# Patient Record
Sex: FEMALE | Race: WHITE | NOT HISPANIC OR LATINO | Employment: OTHER | URBAN - METROPOLITAN AREA
[De-identification: names, ages, dates, MRNs, and addresses within clinical notes are randomized per-mention and may not be internally consistent; named-entity substitution may affect disease eponyms.]

---

## 2018-02-20 ENCOUNTER — ESTABLISHED COMPREHENSIVE EXAM (OUTPATIENT)
Dept: URBAN - METROPOLITAN AREA CLINIC 39 | Facility: CLINIC | Age: 33
End: 2018-02-20

## 2018-02-20 DIAGNOSIS — H04.123: ICD-10-CM

## 2018-02-20 DIAGNOSIS — Z98.890: ICD-10-CM

## 2018-02-20 PROCEDURE — 92014CFS ESTABLISHED PT, EMPLOYEE ROUTINE COMP EXAM

## 2018-02-20 ASSESSMENT — TONOMETRY
OD_IOP_MMHG: 14
OS_IOP_MMHG: 14

## 2018-02-20 ASSESSMENT — KERATOMETRY
OD_AXISANGLE_DEGREES: 178
OD_K2POWER_DIOPTERS: 45.00
OS_AXISANGLE2_DEGREES: 093
OD_AXISANGLE2_DEGREES: 088
OD_K1POWER_DIOPTERS: 44.25
OS_K1POWER_DIOPTERS: 44.50
OS_AXISANGLE_DEGREES: 003
OS_K2POWER_DIOPTERS: 45.50

## 2018-02-20 ASSESSMENT — VISUAL ACUITY
OD_SC: J1
OS_SC: 20/25+2
OS_SC: J1
OD_SC: 20/20-2

## 2021-06-24 NOTE — PROCEDURE NOTE: SURGICAL
<p>Prior to commencing surgery patient identification, surgical procedure, site, and side were confirmed by Dr. Aislinn Rios. Following topical proparacaine anesthesia, the patient was positioned at the YAG laser, a contact lens coupled to the cornea with methylcellulose and an axial posterior capsulotomy performed without complication using 3.4 Mj x 24. Excess methylcellulose was washed from the eye, one drop of Alphagan was instilled and the patient returned to the holding area having tolerated the procedure well and without complication. </p><p> [id number]</p>

## 2021-09-23 NOTE — PROCEDURE NOTE: SURGICAL
<p>Prior to commencing surgery patient identification, surgical procedure, site, and side were confirmed by Dr. Eloy Lopez. Following topical proparacaine anesthesia, the patient was positioned at the YAG laser, a contact lens coupled to the cornea with methylcellulose and an axial posterior capsulotomy performed without complication using 3.3 Mj x 34. Excess methylcellulose was washed from the eye, one drop of Alphagan was instilled and the patient returned to the holding area having tolerated the procedure well and without complication. </p><p>MRN [id number]</p>

## 2022-02-03 ENCOUNTER — COMPREHENSIVE EXAM (OUTPATIENT)
Dept: URBAN - METROPOLITAN AREA CLINIC 39 | Facility: CLINIC | Age: 37
End: 2022-02-03

## 2022-02-03 DIAGNOSIS — Z98.890: ICD-10-CM

## 2022-02-03 DIAGNOSIS — H04.123: ICD-10-CM

## 2022-02-03 PROCEDURE — 92014CFS ESTABLISHED PT, EMPLOYEE ROUTINE COMP EXAM

## 2022-02-03 ASSESSMENT — KERATOMETRY
OS_AXISANGLE_DEGREES: 003
OD_K1POWER_DIOPTERS: 44.25
OD_K2POWER_DIOPTERS: 45.00
OS_AXISANGLE2_DEGREES: 093
OS_K1POWER_DIOPTERS: 44.50
OS_K2POWER_DIOPTERS: 45.50
OD_AXISANGLE2_DEGREES: 088
OD_AXISANGLE_DEGREES: 178

## 2022-02-03 ASSESSMENT — VISUAL ACUITY
OU_SC: J1+
OD_SC: J1+
OU_SC: 20/15
OD_SC: 20/20
OS_SC: 20/20
OS_SC: J1+

## 2022-02-03 ASSESSMENT — TONOMETRY
OD_IOP_MMHG: 13
OS_IOP_MMHG: 14

## 2023-10-27 ENCOUNTER — ESTABLISHED PATIENT (OUTPATIENT)
Dept: URBAN - METROPOLITAN AREA CLINIC 39 | Facility: CLINIC | Age: 38
End: 2023-10-27

## 2023-10-27 PROCEDURE — 96999SEM SEMAGLUTIDE

## 2023-10-27 ASSESSMENT — KERATOMETRY
OD_K2POWER_DIOPTERS: 45.00
OS_AXISANGLE2_DEGREES: 093
OD_AXISANGLE_DEGREES: 178
OS_AXISANGLE_DEGREES: 003
OD_K1POWER_DIOPTERS: 44.25
OS_K2POWER_DIOPTERS: 45.50
OD_AXISANGLE2_DEGREES: 088
OS_K1POWER_DIOPTERS: 44.50

## 2023-11-03 ENCOUNTER — FOLLOW UP (OUTPATIENT)
Dept: URBAN - METROPOLITAN AREA CLINIC 35 | Facility: CLINIC | Age: 38
End: 2023-11-03

## 2023-11-03 PROCEDURE — 96999SEM SEMAGLUTIDE

## 2023-11-03 ASSESSMENT — KERATOMETRY
OS_K2POWER_DIOPTERS: 45.50
OD_K1POWER_DIOPTERS: 44.25
OD_K2POWER_DIOPTERS: 45.00
OD_AXISANGLE_DEGREES: 178
OS_AXISANGLE_DEGREES: 003
OS_K1POWER_DIOPTERS: 44.50
OD_AXISANGLE2_DEGREES: 088
OS_AXISANGLE2_DEGREES: 093

## 2023-11-10 ENCOUNTER — FOLLOW UP (OUTPATIENT)
Dept: URBAN - METROPOLITAN AREA CLINIC 39 | Facility: CLINIC | Age: 38
End: 2023-11-10

## 2023-11-10 PROCEDURE — 96999SEM SEMAGLUTIDE

## 2023-11-10 ASSESSMENT — KERATOMETRY
OD_K1POWER_DIOPTERS: 44.25
OD_AXISANGLE2_DEGREES: 088
OS_AXISANGLE_DEGREES: 003
OS_K2POWER_DIOPTERS: 45.50
OS_AXISANGLE2_DEGREES: 093
OD_K2POWER_DIOPTERS: 45.00
OD_AXISANGLE_DEGREES: 178
OS_K1POWER_DIOPTERS: 44.50

## 2023-11-17 ENCOUNTER — FOLLOW UP (OUTPATIENT)
Dept: URBAN - METROPOLITAN AREA CLINIC 35 | Facility: CLINIC | Age: 38
End: 2023-11-17

## 2023-11-17 PROCEDURE — 96999SEM SEMAGLUTIDE

## 2023-11-17 ASSESSMENT — KERATOMETRY
OS_K2POWER_DIOPTERS: 45.50
OS_AXISANGLE_DEGREES: 003
OD_AXISANGLE2_DEGREES: 088
OD_K2POWER_DIOPTERS: 45.00
OD_AXISANGLE_DEGREES: 178
OS_AXISANGLE2_DEGREES: 093
OS_K1POWER_DIOPTERS: 44.50
OD_K1POWER_DIOPTERS: 44.25

## 2023-12-01 ENCOUNTER — FOLLOW UP (OUTPATIENT)
Dept: URBAN - METROPOLITAN AREA CLINIC 35 | Facility: CLINIC | Age: 38
End: 2023-12-01

## 2023-12-01 PROCEDURE — 96999SEM SEMAGLUTIDE

## 2023-12-01 ASSESSMENT — KERATOMETRY
OD_K2POWER_DIOPTERS: 45.00
OS_K1POWER_DIOPTERS: 44.50
OS_AXISANGLE_DEGREES: 003
OD_K1POWER_DIOPTERS: 44.25
OD_AXISANGLE2_DEGREES: 088
OD_AXISANGLE_DEGREES: 178
OS_K2POWER_DIOPTERS: 45.50
OS_AXISANGLE2_DEGREES: 093

## 2023-12-08 ENCOUNTER — FOLLOW UP (OUTPATIENT)
Dept: URBAN - METROPOLITAN AREA CLINIC 39 | Facility: CLINIC | Age: 38
End: 2023-12-08

## 2023-12-08 PROCEDURE — 96999SEM SEMAGLUTIDE

## 2023-12-08 ASSESSMENT — KERATOMETRY
OD_AXISANGLE2_DEGREES: 088
OS_AXISANGLE2_DEGREES: 093
OS_AXISANGLE_DEGREES: 003
OS_K2POWER_DIOPTERS: 45.50
OD_K2POWER_DIOPTERS: 45.00
OD_AXISANGLE_DEGREES: 178
OD_K1POWER_DIOPTERS: 44.25
OS_K1POWER_DIOPTERS: 44.50

## 2023-12-15 ENCOUNTER — FOLLOW UP (OUTPATIENT)
Dept: URBAN - METROPOLITAN AREA CLINIC 35 | Facility: CLINIC | Age: 38
End: 2023-12-15

## 2023-12-15 PROCEDURE — 96999SEM SEMAGLUTIDE

## 2023-12-15 ASSESSMENT — KERATOMETRY
OS_AXISANGLE_DEGREES: 003
OD_AXISANGLE2_DEGREES: 088
OS_AXISANGLE2_DEGREES: 093
OD_K1POWER_DIOPTERS: 44.25
OD_K2POWER_DIOPTERS: 45.00
OS_K1POWER_DIOPTERS: 44.50
OD_AXISANGLE_DEGREES: 178
OS_K2POWER_DIOPTERS: 45.50

## 2023-12-22 ENCOUNTER — FOLLOW UP (OUTPATIENT)
Dept: URBAN - METROPOLITAN AREA CLINIC 39 | Facility: CLINIC | Age: 38
End: 2023-12-22

## 2023-12-22 PROCEDURE — 96999SEM SEMAGLUTIDE

## 2023-12-22 ASSESSMENT — KERATOMETRY
OS_K2POWER_DIOPTERS: 45.50
OD_K2POWER_DIOPTERS: 45.00
OD_AXISANGLE2_DEGREES: 088
OD_AXISANGLE_DEGREES: 178
OS_AXISANGLE2_DEGREES: 093
OS_AXISANGLE_DEGREES: 003
OD_K1POWER_DIOPTERS: 44.25
OS_K1POWER_DIOPTERS: 44.50